# Patient Record
Sex: MALE | Race: ASIAN | NOT HISPANIC OR LATINO | URBAN - METROPOLITAN AREA
[De-identification: names, ages, dates, MRNs, and addresses within clinical notes are randomized per-mention and may not be internally consistent; named-entity substitution may affect disease eponyms.]

---

## 2018-10-12 PROBLEM — Z00.00 ENCOUNTER FOR PREVENTIVE HEALTH EXAMINATION: Status: ACTIVE | Noted: 2018-10-12

## 2019-03-20 ENCOUNTER — EMERGENCY (EMERGENCY)
Facility: HOSPITAL | Age: 34
LOS: 1 days | Discharge: HOME | End: 2019-03-20
Attending: EMERGENCY MEDICINE | Admitting: EMERGENCY MEDICINE
Payer: MEDICAID

## 2019-03-20 VITALS
DIASTOLIC BLOOD PRESSURE: 81 MMHG | OXYGEN SATURATION: 96 % | TEMPERATURE: 99 F | RESPIRATION RATE: 18 BRPM | HEART RATE: 91 BPM | SYSTOLIC BLOOD PRESSURE: 134 MMHG

## 2019-03-20 VITALS
OXYGEN SATURATION: 100 % | RESPIRATION RATE: 16 BRPM | DIASTOLIC BLOOD PRESSURE: 90 MMHG | SYSTOLIC BLOOD PRESSURE: 143 MMHG | TEMPERATURE: 99 F | HEART RATE: 91 BPM

## 2019-03-20 PROCEDURE — 99283 EMERGENCY DEPT VISIT LOW MDM: CPT | Mod: 25

## 2019-03-20 RX ORDER — DOCUSATE SODIUM 100 MG
1 CAPSULE ORAL
Qty: 14 | Refills: 0 | OUTPATIENT
Start: 2019-03-20 | End: 2019-03-26

## 2019-03-20 RX ORDER — POLYETHYLENE GLYCOL 3350 17 G/17G
17 POWDER, FOR SOLUTION ORAL
Qty: 1 | Refills: 0 | OUTPATIENT
Start: 2019-03-20 | End: 2019-03-26

## 2019-03-20 RX ORDER — NITROGLYCERIN 6.5 MG
1 CAPSULE, EXTENDED RELEASE ORAL
Qty: 1 | Refills: 0 | OUTPATIENT
Start: 2019-03-20 | End: 2019-03-31

## 2019-03-20 RX ORDER — DOCUSATE SODIUM 100 MG
100 CAPSULE ORAL ONCE
Qty: 0 | Refills: 0 | Status: COMPLETED | OUTPATIENT
Start: 2019-03-20 | End: 2019-03-20

## 2019-03-20 RX ORDER — HYDROCORTISONE 1 %
1 OINTMENT (GRAM) TOPICAL ONCE
Qty: 0 | Refills: 0 | Status: COMPLETED | OUTPATIENT
Start: 2019-03-20 | End: 2019-03-20

## 2019-03-20 RX ORDER — NITROGLYCERIN 6.5 MG
1 CAPSULE, EXTENDED RELEASE ORAL ONCE
Qty: 0 | Refills: 0 | Status: COMPLETED | OUTPATIENT
Start: 2019-03-20 | End: 2019-03-20

## 2019-03-20 RX ORDER — HYDROCORTISONE 1 %
1 OINTMENT (GRAM) TOPICAL
Qty: 1 | Refills: 0 | OUTPATIENT
Start: 2019-03-20 | End: 2019-03-31

## 2019-03-20 RX ORDER — POLYETHYLENE GLYCOL 3350 17 G/17G
17 POWDER, FOR SOLUTION ORAL ONCE
Qty: 0 | Refills: 0 | Status: COMPLETED | OUTPATIENT
Start: 2019-03-20 | End: 2019-03-20

## 2019-03-20 RX ADMIN — Medication 1 APPLICATION(S): at 05:29

## 2019-03-20 RX ADMIN — Medication 100 MILLIGRAM(S): at 05:29

## 2019-03-20 RX ADMIN — POLYETHYLENE GLYCOL 3350 17 GRAM(S): 17 POWDER, FOR SOLUTION ORAL at 05:28

## 2019-03-20 RX ADMIN — Medication 1 SUPPOSITORY(S): at 05:28

## 2019-03-20 NOTE — ED ADULT NURSE NOTE - NSIMPLEMENTINTERV_GEN_ALL_ED
Implemented All Universal Safety Interventions:  [place] to call system. Call bell, personal items and telephone within reach. Instruct patient to call for assistance. Room bathroom lighting operational. Non-slip footwear when patient is off stretcher. Physically safe environment: no spills, clutter or unnecessary equipment. Stretcher in lowest position, wheels locked, appropriate side rails in place.

## 2019-03-20 NOTE — ED PROVIDER NOTE - NS ED ROS FT
Constitutional: no fever, no chills.  ENMT: no sore throat.  CV: no chest pain.  Resp: no cough, no shortness of breath.  GI: no abdominal pain, no nausea, no vomiting, no diarrhea.  : no dysuria, no hematuria.  MSK: no back pain, no neck pain.  Skin: no rashes.  Neuro: no headache, no loss of consciousness, no weakness, no numbness, no tingling.  Psych: no known mental health issues.  Endo: no diabetes, no thyroid trouble.

## 2019-03-20 NOTE — ED ADULT TRIAGE NOTE - CHIEF COMPLAINT QUOTE
Pt comes in for c/o rectal bleeding, pain and burning that began yesterday. Pt reports he has the feeling to have BM but nothing comes out, then reports that he had x2 BM during the night. Pt also reporting lower back pain than began around the same time, everything worse today. Pt appears in no acute distress, but uncomfortable, denies any pmhx, vs as noted.

## 2019-03-20 NOTE — ED PROVIDER NOTE - ATTENDING CONTRIBUTION TO CARE
pt with constipation and rectal pain described as burning and severe.  Noted some blood on paper after a hard bowel movement    Gen:  Well appearing in NAD  Head:  NC/AT  Resp: No distress   Abd: soft NT ND - rectal - fissure appreciated  Ext: no deformities  Skin: warm and dry as visualized     Pt with constipation leading to fissure - will treat symptomatically

## 2019-03-20 NOTE — ED PROVIDER NOTE - OBJECTIVE STATEMENT
Resident: 33y M presents with rectal pain, bleeding. Starting yesterday, patient has been straining to have BM, has had hard pebbly stools, patient has had pain with BM, as well has blood on toilet paper, and blood dripping into toilet bowel with few small clots. He also had this several times today. Has never happened to him before. Resident: 33y M presents with rectal pain, bleeding. Starting yesterday, patient has been straining to have BM, has had hard pebbly stools, patient has had pain with BM, also pain when sitting, as well has blood on toilet paper, and blood dripping into toilet bowel with few small clots. He also had this several times today. Has never happened to him before.

## 2019-03-20 NOTE — ED PROVIDER NOTE - NSFOLLOWUPINSTRUCTIONS_ED_ALL_ED_FT
Use medications as prescribed. Use sitz baths as directed. Stay well hydrated and eat high-fiber diet. Follow-up with gastroenterology if symptoms persist. Return immediately to the emergency department if any worsening or concerning symptoms.

## 2019-03-20 NOTE — ED ADULT NURSE NOTE - OBJECTIVE STATEMENT
Pt received to room 2. Pt c/o of constipation, "pebbly" stool X1 week. Yesterday pt noticed red blood in toilet and on toilet paper accompanied by increased rectal pain. Pt is very uncomfortable when sitting. A fissure is noted upon rectal exam as per Dr. Bishop. Pt is A&Ox3 and ambulates independently. Pt has no medical hx.

## 2019-03-20 NOTE — ED PROVIDER NOTE - PHYSICAL EXAMINATION
Resident:   Gen: well appearing, of stated age, no acute distress  Head: NC, AT  ENT: PERRL, MMM  Neck: supple with full ROM   Chest: CTAB, no retractions, rate normal, appears to breathe comfortably  Heart: RRR S1S2, No peripheral edema, bilateral pulses in arms and legs  Abd: Soft non-tender, no rebound or guarding  Back: No spinal deformity  Rectal: (Edward Stanley RN) fissure at 6 o'clock, no hemorrhoids, IAN with pain, no internal hemorrhoids palpated, blood tinged mucous  Ext: Moving all 4 extremities without obvious impairment to ROM, no obvious weakness  Neuro: fluid speech  Psych: No anxiety, depression or pressured speech noted  Skin: no urticaria, no diffuse rash

## 2019-03-20 NOTE — ED PROVIDER NOTE - CLINICAL SUMMARY MEDICAL DECISION MAKING FREE TEXT BOX
Resident: 33y M with rectal pain, constipation, rectal bleeding. vitals wnl. exam notable for fissure. Will give symptomatic treatment, discharge with prescriptions, GI follow-up.

## 2021-10-25 ENCOUNTER — EMERGENCY (EMERGENCY)
Facility: HOSPITAL | Age: 36
LOS: 1 days | Discharge: HOME | End: 2021-10-25
Admitting: EMERGENCY MEDICINE
Payer: MEDICAID

## 2021-10-25 VITALS
TEMPERATURE: 98 F | HEART RATE: 94 BPM | RESPIRATION RATE: 16 BRPM | DIASTOLIC BLOOD PRESSURE: 85 MMHG | SYSTOLIC BLOOD PRESSURE: 152 MMHG | OXYGEN SATURATION: 100 %

## 2021-10-25 VITALS
SYSTOLIC BLOOD PRESSURE: 139 MMHG | RESPIRATION RATE: 16 BRPM | TEMPERATURE: 98 F | DIASTOLIC BLOOD PRESSURE: 92 MMHG | HEART RATE: 94 BPM | OXYGEN SATURATION: 97 %

## 2021-10-25 PROCEDURE — 99284 EMERGENCY DEPT VISIT MOD MDM: CPT

## 2021-10-25 NOTE — ED PROVIDER NOTE - PATIENT PORTAL LINK FT
You can access the FollowMyHealth Patient Portal offered by [facility] by registering at the following website: [email or web address]. By joining [employer]’s FollowMyHealth portal, you will also be able to view your health information using other applications (apps) compatible with our system.

## 2021-10-25 NOTE — ED ADULT TRIAGE NOTE - TEMPERATURE IN CELSIUS (DEGREES C)
Pt has several abnormal labs --pt can be seen today--televisiy    High cholesterol  High blood sugar  Low potassium 36.7

## 2021-10-25 NOTE — ED PROVIDER NOTE - CLINICAL SUMMARY MEDICAL DECISION MAKING FREE TEXT BOX
Pt is a 35 y/o M PMHx DM p/w bilateral gluteal pain since yesterday. -- likely contusion; not clinically concerning for fracture; no neurological deficits -- pain control, PMD follow up

## 2021-10-25 NOTE — ED PROVIDER NOTE - NSFOLLOWUPINSTRUCTIONS_ED_ALL_ED_FT
Advance activity as tolerated.  Continue all previously prescribed medications as directed unless otherwise instructed.  Take Tylenol 650mg (Two 325 mg pills) every 4-6 hours as needed for pain or fevers.  Take Motrin (also sold as Advil or Ibuprofen) 400-600 mg (two or three 200 mg over the counter pills) every 8 hours as needed for moderate pain or fevers-- take with food.   Apply cool compresses for 15 minutes to affected area, 3-4 times per day.  Follow up with your primary care physician in 48-72 hours- bring copies of your results.  Return to the ER for worsening or persistent symptoms, including but not limited to worsening/persistent pain, numbness, weakness, difficulty voiding, difficulty passing bowel movements, incontinence, difficulty walking, falls, and/or ANY NEW OR CONCERNING SYMPTOMS. If you have issues obtaining follow up, please call: [phone] to obtain a doctor or specialist who takes your insurance in your area.  You may call [phone] to make an appointment with the internal medicine clinic.

## 2021-10-25 NOTE — ED ADULT TRIAGE NOTE - CHIEF COMPLAINT QUOTE
Patient c/o lower back pain s/p trip and fall down seven steps yesterday. Denies LOC/head strike. Took 500mg Tylenol at 4pm with minimal relief. Ambulatory with steady gait in triage. PMH DM.

## 2021-10-25 NOTE — ED PROVIDER NOTE - OBJECTIVE STATEMENT
Pt is a 37 y/o M PMHx DM p/w bilateral gluteal pain since yesterday.  Pt reports he missed a step and slid down ~8 steps, striking bilateral gluteal region w/o head trauma or LOC.  Pt reports developing moderate intensity gluteal pain which occurs solely upon sitting down and resolves upon standing up or walking.  Pt denies any fevers, chills, numbness, weakness, difficulty voiding, difficulty passing bowel movements, incontinence, headache, neck pain, mid back pain, abdominal pain, lightheadedness, dizziness, difficulty walking, use of blood thinners, ETOH abuse, h/o malignancy or any other specific complaints.

## 2023-02-08 NOTE — ED ADULT TRIAGE NOTE - PRO INTERPRETER NEED 2
Quality 130: Documentation Of Current Medications In The Medical Record: Current Medications Documented
Detail Level: Detailed
English
